# Patient Record
(demographics unavailable — no encounter records)

---

## 2024-11-05 NOTE — ADDENDUM
[FreeTextEntry1] : Documented by Roman Anderson acting as a scribe for Dr. Mendoza on 11/04/2024.   All medical record entries made by the Scribe were at my, Dr. Mendoza, direction and personally dictated by me on 11/04/2024. I have reviewed the chart and agree that the record accurately reflects my personal performances of the history, physical exam, assessment and plan. I have also personally directed, reviewed, and agree with the instructions.

## 2024-11-05 NOTE — CONSULT LETTER
[Dear  ___] : Dear  [unfilled], [Consult Letter:] : I had the pleasure of evaluating your patient, [unfilled]. [Please see my note below.] : Please see my note below. [Consult Closing:] : Thank you very much for allowing me to participate in the care of this patient.  If you have any questions, please do not hesitate to contact me. [Sincerely,] : Sincerely, [FreeTextEntry2] : Desire Kumar MD  [address] [place] [address] Phone: [phone] [FreeTextEntry3] : Mayur Mendoza MD Division of Pediatric, General, Thoracic and Endoscopic Surgery  [facility]

## 2024-11-05 NOTE — ASSESSMENT
[FreeTextEntry1] : Shania is a 2 year old girl with a right clavicular abscess likely representing a superinfected cystic lesion that she presented with earlier this year.  I reviewed this finding with mom and dad and offered reassurance.  I discussed treatment options including an incision and drainage of the today in the office versus warm soaks and antibiotics at home to allow for possible spontaneous drainage. I reviewed the indications, risks, benefits and alternatives of the I&D procedure with mom and dad. The risks discussed included but were not limited to bleeding, infection, injury to adjacent structures, or persistent/recurrent abscess requiring further intervention.  They understand with conservative management, the abscess may not spontaneously drain and require I&D in the upcoming days.  Mom and dad were in agreement to proceed with I&D today. Informed consent was signed.   A time out was performed. Emla cream was placed on the region. The area was prepped in sterile fashion. An incision was made in most fluctuant aspect of the abscess. The cavity was probed and purulent fluid was expressed. The cavity was irrigated with saline and a sterile dressing was placed on the region. Shania tolerated the procedure well. I have prescribed a course of clindamycin as well for Shania to take. The family has demonstrated understanding.  They will closely monitor the site and apply warm soaks given the persistent induration.  They will follow up with me in 4 days for a wound check.  I reviewed the possibility that the underlying lesion resolves after this infection; however, I also discussed the possibility that the lesion reforms in which case surgical resection may be beneficial to prevent recurrent infections at the site.  Mom and dad know we will continue to monitor the area and may proceed with imaging in the future to better assess the region.  They know to contact me prior to their next visit with any further questions or concerns or should Shania develop any new or concerning symptoms.

## 2024-11-05 NOTE — PHYSICAL EXAM
[NL] : grossly intact [FreeTextEntry2] : Right clavicular abscess, ~4cm transversely, lateral aspect indurated, medial aspect with some fluctuance, no expressible drainage, overlying hyperemia but no surrounding cellulitis, mildly tender to palpation

## 2024-11-05 NOTE — HISTORY OF PRESENT ILLNESS
[FreeTextEntry1] : Shania is a 2 year old girl who is here today for follow up for a right clavicular neck mass. She was initially seen on 1/12/24 and has since been doing well, but mom notes the lesion appears to have become significantly enlarged and firm over the last two week. Shania has complained of pain in the area.  She has not had any drainage from the site.  She has not had any associated fevers.

## 2024-11-05 NOTE — CONSULT LETTER
[Dear  ___] : Dear  [unfilled], [Consult Letter:] : I had the pleasure of evaluating your patient, [unfilled]. [Please see my note below.] : Please see my note below. [Consult Closing:] : Thank you very much for allowing me to participate in the care of this patient.  If you have any questions, please do not hesitate to contact me. [Sincerely,] : Sincerely, [FreeTextEntry2] : Desire Kumar MD  [address] [place] [address] Phone: [phone] [FreeTextEntry3] : Mayur Mendoza MD Division of Pediatric, General, Thoracic and Endoscopic Surgery  [facility]

## 2024-11-05 NOTE — REASON FOR VISIT
[Follow-up - Scheduled] : a follow-up, scheduled visit for [Patient] : patient [Mother] : mother [FreeTextEntry3] : Neck mass [FreeTextEntry4] : Desire Kumar MD [Parents] : parents

## 2024-11-05 NOTE — CONSULT LETTER
[Dear  ___] : Dear  [unfilled], [Consult Letter:] : I had the pleasure of evaluating your patient, [unfilled]. [Please see my note below.] : Please see my note below. [Consult Closing:] : Thank you very much for allowing me to participate in the care of this patient.  If you have any questions, please do not hesitate to contact me. [Sincerely,] : Sincerely, [FreeTextEntry2] : Desire Kumar MD  [address] [place] [address] Phone: [phone] [FreeTextEntry3] : Mayur Mendoza MD Division of Pediatric, General, Thoracic and Endoscopic Surgery  [facility]

## 2024-11-10 NOTE — ASSESSMENT
[FreeTextEntry1] : Shania is a 2-year-old girl here today now 4 days after an incision and drainage of her right clavicular abscess. She is currently doing well at this time. On exam today, the abscess has significantly improved and there is no evidence of any active infection or undrained collection. I offered my reassurance to mom and dad who are pleased with the results.  I discussed the possibility that the residual cyst will no longer be present after this infection; however, they understand the possibility that the cyst persists.  Should that be the case, they understand the possibility of recurrent infectious complications.  Given this, we agree to proceed with an ultrasound of the site in approximately one month after the inflammatory changes subside.  Should the lesion be present, mom and dad would like to proceed with removal to prevent further symptoms.  They will follow up with me after the ultrasound to review the results and determine next steps.  They know to contact me sooner with any further questions or concerns or should Shania develop any new or recurrent symptoms.

## 2024-11-10 NOTE — CONSULT LETTER
[Dear  ___] : Dear  [unfilled], [Consult Letter:] : I had the pleasure of evaluating your patient, [unfilled]. [Please see my note below.] : Please see my note below. [Consult Closing:] : Thank you very much for allowing me to participate in the care of this patient.  If you have any questions, please do not hesitate to contact me. [Sincerely,] : Sincerely, [FreeTextEntry2] : [place] [address] [place] [address] Phone: [phone] [FreeTextEntry3] :  Mayur Mendoza MD  Division of Pediatric, General, Thoracic, and Endoscopic Surgery  [facility]

## 2024-11-10 NOTE — REASON FOR VISIT
[_____ Day(s)] : [unfilled] day(s)  [Other: ____] : [unfilled] [Parents] : parents [Normal bowel movements] : ~He/She~ has normal bowel movements [Tolerating Diet] : ~He/She~ is tolerating diet [Normal range of motion] : ~He/She~ has normal range of motion [Pain] : ~He/She~ does not have pain [Fever] : ~He/She~ does not have fever [Vomiting] : ~He/She~ does not have vomiting [Redness at incision] : ~He/She~ does not have redness at incision [Drainage at incision] : ~He/She~ does not have drainage at incision [Swelling at surgical site] : ~He/She~ does not have swelling at surgical site [Yellowing of skin/eyes] : ~He/She~ does not have yellowing of skin/eyes [Patient] : patient [de-identified] : 11/04/2024 [de-identified] : Mayur Mendoza  [de-identified] : Since the procedure, mom says she has been doing well.  Mom thinks the site has gotten much better and the size has gone down.  She has not had any drainage at the site.  She is no longer complaining of any pain at the site.  She has not had any fevers.  Mom has been trying to give her antibiotics.

## 2024-11-10 NOTE — ADDENDUM
[FreeTextEntry1] : Documented by Queenie Choudhary acting as a scribe for Dr. Mendoza on 11/08/2024. All medical record entries made by the Scribe were at Dr. Mendoza's direction and personally dictated by me on 11/08/2024. I have reviewed the chart and agree that the record accurately reflects my personal performances of the history, physical exam, assessment, and plan. I have also personally directed, reviewed, and agree with the plan.

## 2024-11-10 NOTE — PHYSICAL EXAM
[NL] : grossly intact [FreeTextEntry2] : Right clavicular abscess site now well healed, mildly raised with mild residual induration, no fluctuance, no erythema, nontender, no drainage

## 2024-11-10 NOTE — CONSULT LETTER
[Dear  ___] : Dear  [unfilled], [Consult Letter:] : I had the pleasure of evaluating your patient, [unfilled]. [Please see my note below.] : Please see my note below. [Consult Closing:] : Thank you very much for allowing me to participate in the care of this patient.  If you have any questions, please do not hesitate to contact me. [Sincerely,] : Sincerely, [FreeTextEntry2] : Des[place] [address] [place] [address] Phone: [phone] [FreeTextEntry3] :  Mayur Mendoza MD  Division of Pediatric, General, Thoracic, and Endoscopic Surgery  [facility]

## 2024-11-10 NOTE — REASON FOR VISIT
[_____ Day(s)] : [unfilled] day(s)  [Other: ____] : [unfilled] [Parents] : parents [Normal bowel movements] : ~He/She~ has normal bowel movements [Tolerating Diet] : ~He/She~ is tolerating diet [Normal range of motion] : ~He/She~ has normal range of motion [Pain] : ~He/She~ does not have pain [Fever] : ~He/She~ does not have fever [Vomiting] : ~He/She~ does not have vomiting [Redness at incision] : ~He/She~ does not have redness at incision [Drainage at incision] : ~He/She~ does not have drainage at incision [Swelling at surgical site] : ~He/She~ does not have swelling at surgical site [Yellowing of skin/eyes] : ~He/She~ does not have yellowing of skin/eyes [Patient] : patient [de-identified] : 11/04/2024 [de-identified] : Mayur Mendoza  [de-identified] : Since the procedure, mom says she has been doing well.  Mom thinks the site has gotten much better and the size has gone down.  She has not had any drainage at the site.  She is no longer complaining of any pain at the site.  She has not had any fevers.  Mom has been trying to give her antibiotics.

## 2024-12-13 NOTE — CONSULT LETTER
[Dear  ___] : Dear  [unfilled], [Consult Letter:] : I had the pleasure of evaluating your patient, [unfilled]. [Please see my note below.] : Please see my note below. [Consult Closing:] : Thank you very much for allowing me to participate in the care of this patient.  If you have any questions, please do not hesitate to contact me. [Sincerely,] : Sincerely, [FreeTextEntry2] : Des[place] [address] [place] [address] Phone: [phone] [FreeTextEntry3] : Mayur Mendoza MD Division of Pediatric, General, Thoracic and Endoscopic Surgery [facility]

## 2024-12-13 NOTE — HISTORY OF PRESENT ILLNESS
[FreeTextEntry1] : Shania is a 2 year old girl here today for follow up for her right clavicular mass. Since her last visit, mom believes the area has improved significantly without any active inflammation or erythema at the site. Shania has not complained of any recent pain or discomfort.  The swelling has now completely resolved, and she has not had any drainage at the site.  She has not had any unexplained fevers.  She had an ultrasound of the site today and they are here to discuss the results.

## 2024-12-13 NOTE — ADDENDUM
[FreeTextEntry1] : Documented by Roman Anderson acting as a scribe for Dr. Mendoza on 12/12/2024.   All medical record entries made by the Scribe were at my, Dr. Mendoza, direction and personally dictated by me on 12/12/2024. I have reviewed the chart and agree that the record accurately reflects my personal performances of the history, physical exam, assessment and plan. I have also personally directed, reviewed, and agree with the instructions.

## 2024-12-13 NOTE — ASSESSMENT
[FreeTextEntry1] : Shania is a 2 year old girl with a history of a right clavicular cystic mass that recently was superinfected resulting in an abscess requiring I&D.  This has currently resolved and she has a small residual firmness in the area but otherwise the swelling has resolved.  She had a follow up ultrasound today that I reviewed with Dr Betts of pediatric radiology and then with mom.  It demonstrates a 8 x 2mm residual cyst (previously 1.3 x 0.9 cm) in the area of the previous abscess with an irregular sinus tract to the skin. with mild peripheral hyperemia.  I offered reassurance to mom and dad regarding these findings.  I reviewed treatment options at this time including ongoing observation versus surgical resection.  They understand that without resection, there is a possibility of recurrent infection/abscess.  However, they understand that at this time the lesion is quite small and may continue to decrease in size over time.  I reviewed the risks of surgical resection including bleeding, infection, injury to adjacent structures, recurrent cyst, etc.  Given how small it is at this time with the possibility of resolution, mom and dad would like to hold off on any intervention at this time and continue to monitor the lesion.  We agree to proceed with a follow up ultrasound in 3 months.  They will follow up with me after the imaging to review the results.  They will closely monitor the site in the interim and know to contact me sooner with any concerns for recurrent infection or should Shania develop any new or recurrent symptoms.  They know to contact me as well with any further questions or concerns.

## 2024-12-13 NOTE — PHYSICAL EXAM
[NL] : grossly intact [FreeTextEntry2] : Right clavicular abscess is well healed without active infection, small palpable subcentimeter firmness, no fluctuance, no erythema

## 2024-12-13 NOTE — CONSULT LETTER
[Dear  ___] : Dear  [unfilled], [Consult Letter:] : I had the pleasure of evaluating your patient, [unfilled]. [Please see my note below.] : Please see my note below. [Consult Closing:] : Thank you very much for allowing me to participate in the care of this patient.  If you have any questions, please do not hesitate to contact me. [Sincerely,] : Sincerely, [FreeTextEntry2] : Desire Kumar [place] [address] [place] [address] Phone: [phone] [FreeTextEntry3] : Mayur Mendoza MD Division of Pediatric, General, Thoracic and Endoscopic Surgery [facility]

## 2024-12-13 NOTE — DATA REVIEWED
[de-identified] : ACC: [id number]     EXAM:  US HEAD NECK SOFT TISSUE   ORDERED BY: SALINAS LUQUE  PROCEDURE DATE:  12/12/2024    INTERPRETATION:  EXAMINATION: US HEAD AND NECK SOFT TISSUE  CLINICAL INFORMATION: assess neck mass;  TECHNIQUE: Real-time ultrasound of the midline neck was performed using a linear transducer and color Doppler interrogation dated 12/12/2024 9:02 AM  COMPARISON: None  FINDINGS: Redemonstrated cystic lesion in the midline neck now measuring 8 x 2 mm, previously 1.3 x 0.9 cm.. There is a slightly thickened rib. There is a irregular sinus tract leading from the cyst to the skin surface corresponding to draining sinus tract. Mild peripheral hyperemia.  IMPRESSION: Significant decrease in size of midline cystic mass with irregular draining sinus tract to skin surface  --- End of Report ---      TRAVIS JEFFERS MD; Attending Radiologist This document has been electronically signed. Dec 12 2024  9:46AM

## 2024-12-13 NOTE — REASON FOR VISIT
[Follow-up - Scheduled] : a follow-up, scheduled visit for [Other: ____] : [unfilled] [Patient] : patient [Parents] : parents [FreeTextEntry4] : Dr Desire Kumar

## 2024-12-13 NOTE — DATA REVIEWED
[de-identified] : ACC: [id number]     EXAM:  US HEAD NECK SOFT TISSUE   ORDERED BY: SALINAS LUQUE  PROCEDURE DATE:  12/12/2024    INTERPRETATION:  EXAMINATION: US HEAD AND NECK SOFT TISSUE  CLINICAL INFORMATION: assess neck mass;  TECHNIQUE: Real-time ultrasound of the midline neck was performed using a linear transducer and color Doppler interrogation dated 12/12/2024 9:02 AM  COMPARISON: None  FINDINGS: Redemonstrated cystic lesion in the midline neck now measuring 8 x 2 mm, previously 1.3 x 0.9 cm.. There is a slightly thickened rib. There is a irregular sinus tract leading from the cyst to the skin surface corresponding to draining sinus tract. Mild peripheral hyperemia.  IMPRESSION: Significant decrease in size of midline cystic mass with irregular draining sinus tract to skin surface  --- End of Report ---      TRAVIS JEFFERS MD; Attending Radiologist This document has been electronically signed. Dec 12 2024  9:46AM

## 2025-04-05 NOTE — CONSULT LETTER
[Dear  ___] : Dear  [unfilled], [Consult Letter:] : I had the pleasure of evaluating your patient, [unfilled]. [Please see my note below.] : Please see my note below. [Consult Closing:] : Thank you very much for allowing me to participate in the care of this patient.  If you have any questions, please do not hesitate to contact me. [Sincerely,] : Sincerely, [FreeTextEntry2] : Desire Kumar MD [address] [place] [address] PHone: [phone] [FreeTextEntry3] : Mayur Mendoza MD Division of Pediatric, General, Thoracic and Endoscopic Surgery [facility]

## 2025-04-05 NOTE — PHYSICAL EXAM
[NL] : grossly intact [FreeTextEntry2] : Right clavicular I&D site is well healed without active infection, no palpable abnormalities, no mass, no cysts, no overlying skin changes, no drainage, nontender

## 2025-04-05 NOTE — DATA REVIEWED
[de-identified] : ACC: [id number]     EXAM:  US HEAD NECK SOFT TISSUE   ORDERED BY: SALINAS LUQUE  PROCEDURE DATE:  04/02/2025    INTERPRETATION:  US HEAD AND NECK SOFT TISSUE  CLINICAL INFORMATION: assess neck mass;  TECHNIQUE: Ultrasound of the neck was performed on 4/2/2025 9:05 AM  COMPARISON: Ultrasound neck 12/12/2024  FINDINGS:  Redemonstrated small cystic lesion in the subcutaneous soft tissues of the midline neck. On the current study measures 0.8 x 0.2 x 0.8 cm. No definitive tract is visualized. There is no associated vascularity.  IMPRESSION:  Stable small cystic lesion in the midline neck. No definite tract is seen on this study  --- End of Report ---      JAVIER SCRUGGS MD; Attending Radiologist This document has been electronically signed. Apr 2 2025  9:17AM

## 2025-04-05 NOTE — ASSESSMENT
[FreeTextEntry1] : Shania is a 3 year old girl with a history of a right clavicular abscess. She has been doing well and remains asymptomatic.  The site has healed well without any obvious palpable abnormalities.  She had an ultrasound yesterday that I reviewed with mom which redemonstrated a stable, small cystic lesion in the subcutaneous soft tissues of the midline neck measuring 0.8 x 0.2 x 0.8 cm.  I offered reassurance to mom given these findings.  I reviewed treatment options at this time and given the lesion is not at all appreciable on exam, we agree to hold off on any intervention.  I reviewed the possibility of recurrent infectious complications and mom knows the signs/symptoms to look out for.  She knows to contact me going forward with any further questions or concerns or should she notice any changes at the site.  Shania can return to see me on an as needed basis.

## 2025-04-05 NOTE — CONSULT LETTER
[Dear  ___] : Dear  [unfilled], [Consult Letter:] : I had the pleasure of evaluating your patient, [unfilled]. [Please see my note below.] : Please see my note below. [Consult Closing:] : Thank you very much for allowing me to participate in the care of this patient.  If you have any questions, please do not hesitate to contact me. [Sincerely,] : Sincerely, [FreeTextEntry2] : [place] [address] [place] [address] PHone: [phone] [FreeTextEntry3] : Mayur Mendoza MD Division of Pediatric, General, Thoracic and Endoscopic Surgery [facility]

## 2025-04-05 NOTE — ADDENDUM
[FreeTextEntry1] : Documented by Roman Anderson acting as a scribe for Dr. Mendoza on 04/03/2025.   All medical record entries made by the Scribe were at my, Dr. Mendoza, direction and personally dictated by me on 04/03/2025. I have reviewed the chart and agree that the record accurately reflects my personal performances of the history, physical exam, assessment and plan. I have also personally directed, reviewed, and agree with the instructions.

## 2025-04-05 NOTE — DATA REVIEWED
[de-identified] : ACC: [id number]     EXAM:  US HEAD NECK SOFT TISSUE   ORDERED BY: SALINAS LUQUE  PROCEDURE DATE:  04/02/2025    INTERPRETATION:  US HEAD AND NECK SOFT TISSUE  CLINICAL INFORMATION: assess neck mass;  TECHNIQUE: Ultrasound of the neck was performed on 4/2/2025 9:05 AM  COMPARISON: Ultrasound neck 12/12/2024  FINDINGS:  Redemonstrated small cystic lesion in the subcutaneous soft tissues of the midline neck. On the current study measures 0.8 x 0.2 x 0.8 cm. No definitive tract is visualized. There is no associated vascularity.  IMPRESSION:  Stable small cystic lesion in the midline neck. No definite tract is seen on this study  --- End of Report ---      JAVIER SCRUGGS MD; Attending Radiologist This document has been electronically signed. Apr 2 2025  9:17AM

## 2025-04-05 NOTE — REASON FOR VISIT
[Follow-up - Scheduled] : a follow-up, scheduled visit for [Other: ____] : [unfilled] [Patient] : patient [Mother] : mother [FreeTextEntry4] : Dr Desire Kumar

## 2025-04-05 NOTE — DATA REVIEWED
[de-identified] : ACC: [id number]     EXAM:  US HEAD NECK SOFT TISSUE   ORDERED BY: SALINAS LUQUE  PROCEDURE DATE:  04/02/2025    INTERPRETATION:  US HEAD AND NECK SOFT TISSUE  CLINICAL INFORMATION: assess neck mass;  TECHNIQUE: Ultrasound of the neck was performed on 4/2/2025 9:05 AM  COMPARISON: Ultrasound neck 12/12/2024  FINDINGS:  Redemonstrated small cystic lesion in the subcutaneous soft tissues of the midline neck. On the current study measures 0.8 x 0.2 x 0.8 cm. No definitive tract is visualized. There is no associated vascularity.  IMPRESSION:  Stable small cystic lesion in the midline neck. No definite tract is seen on this study  --- End of Report ---      JAVIER SCRUGGS MD; Attending Radiologist This document has been electronically signed. Apr 2 2025  9:17AM

## 2025-04-05 NOTE — CONSULT LETTER
[Dear  ___] : Dear  [unfilled], [Consult Letter:] : I had the pleasure of evaluating your patient, [unfilled]. [Please see my note below.] : Please see my note below. [Consult Closing:] : Thank you very much for allowing me to participate in the care of this patient.  If you have any questions, please do not hesitate to contact me. [Sincerely,] : Sincerely, [FreeTextEntry2] : [place] [address] [place] [address] PHone: [phone] [FreeTextEntry3] : Mayur Mendoza MD Division of Pediatric, General, Thoracic and Endoscopic Surgery [facility]        English

## 2025-04-05 NOTE — HISTORY OF PRESENT ILLNESS
[FreeTextEntry1] : Shania is a 3 year old girl with a history of a right clavicular mass here today to follow up.  Since her last visit 4 months ago, mom says she has been doing well.  Mom has not felt any abnormalities at the site and thinks it has healed well.  Shania does not complain of any pain in the area.  Mom has not noticed any swelling at the site.  Shania has not had any drainage.  She had an ultrasound today and they are here to discuss the results.